# Patient Record
Sex: MALE | Race: WHITE | Employment: OTHER | URBAN - NONMETROPOLITAN AREA
[De-identification: names, ages, dates, MRNs, and addresses within clinical notes are randomized per-mention and may not be internally consistent; named-entity substitution may affect disease eponyms.]

---

## 2024-07-30 ENCOUNTER — HOSPITAL ENCOUNTER (OUTPATIENT)
Dept: WOUND CARE | Age: 69
Discharge: HOME | End: 2024-07-30
Payer: MEDICARE

## 2024-07-30 VITALS
DIASTOLIC BLOOD PRESSURE: 77 MMHG | RESPIRATION RATE: 16 BRPM | HEART RATE: 94 BPM | TEMPERATURE: 97.2 F | SYSTOLIC BLOOD PRESSURE: 153 MMHG

## 2024-07-30 DIAGNOSIS — L97.222 VENOUS STASIS ULCER OF LEFT CALF WITH FAT LAYER EXPOSED, UNSPECIFIED WHETHER VARICOSE VEINS PRESENT (HCC): ICD-10-CM

## 2024-07-30 DIAGNOSIS — I87.8 VENOUS STASIS OF BOTH LOWER EXTREMITIES: ICD-10-CM

## 2024-07-30 DIAGNOSIS — Q82.0 HEREDITARY LYMPHEDEMA OF LEGS: ICD-10-CM

## 2024-07-30 DIAGNOSIS — I83.022 VENOUS STASIS ULCER OF LEFT CALF WITH FAT LAYER EXPOSED, UNSPECIFIED WHETHER VARICOSE VEINS PRESENT (HCC): ICD-10-CM

## 2024-07-30 DIAGNOSIS — L97.522 DIABETIC ULCER OF TOE OF LEFT FOOT ASSOCIATED WITH TYPE 2 DIABETES MELLITUS, WITH FAT LAYER EXPOSED (HCC): Primary | ICD-10-CM

## 2024-07-30 DIAGNOSIS — E11.621 DIABETIC ULCER OF TOE OF LEFT FOOT ASSOCIATED WITH TYPE 2 DIABETES MELLITUS, WITH FAT LAYER EXPOSED (HCC): Primary | ICD-10-CM

## 2024-07-30 PROCEDURE — 29581 APPL MULTLAYER CMPRN SYS LEG: CPT

## 2024-07-30 PROCEDURE — 99203 OFFICE O/P NEW LOW 30 MIN: CPT | Performed by: NURSE PRACTITIONER

## 2024-07-30 PROCEDURE — 11042 DBRDMT SUBQ TIS 1ST 20SQCM/<: CPT | Performed by: NURSE PRACTITIONER

## 2024-07-30 PROCEDURE — 11042 DBRDMT SUBQ TIS 1ST 20SQCM/<: CPT

## 2024-07-30 PROCEDURE — 99203 OFFICE O/P NEW LOW 30 MIN: CPT

## 2024-07-30 RX ORDER — AMLODIPINE BESYLATE 10 MG/1
10 TABLET ORAL DAILY
COMMUNITY

## 2024-07-30 RX ORDER — LIDOCAINE 40 MG/G
CREAM TOPICAL ONCE
OUTPATIENT
Start: 2024-07-30 | End: 2024-07-30

## 2024-07-30 RX ORDER — ATORVASTATIN CALCIUM 10 MG/1
10 TABLET, FILM COATED ORAL DAILY
COMMUNITY

## 2024-07-30 RX ORDER — CLOBETASOL PROPIONATE 0.5 MG/G
OINTMENT TOPICAL ONCE
OUTPATIENT
Start: 2024-07-30 | End: 2024-07-30

## 2024-07-30 RX ORDER — IBUPROFEN 200 MG
TABLET ORAL ONCE
OUTPATIENT
Start: 2024-07-30 | End: 2024-07-30

## 2024-07-30 RX ORDER — LANOLIN ALCOHOL/MO/W.PET/CERES
1000 CREAM (GRAM) TOPICAL DAILY
COMMUNITY

## 2024-07-30 RX ORDER — BENZTROPINE MESYLATE 0.5 MG/1
0.5 TABLET ORAL 2 TIMES DAILY
COMMUNITY

## 2024-07-30 RX ORDER — GENTAMICIN SULFATE 1 MG/G
OINTMENT TOPICAL ONCE
OUTPATIENT
Start: 2024-07-30 | End: 2024-07-30

## 2024-07-30 RX ORDER — BETAMETHASONE DIPROPIONATE 0.5 MG/G
CREAM TOPICAL ONCE
OUTPATIENT
Start: 2024-07-30 | End: 2024-07-30

## 2024-07-30 RX ORDER — TRIAMCINOLONE ACETONIDE 1 MG/G
OINTMENT TOPICAL ONCE
OUTPATIENT
Start: 2024-07-30 | End: 2024-07-30

## 2024-07-30 RX ORDER — LIDOCAINE 50 MG/G
OINTMENT TOPICAL ONCE
OUTPATIENT
Start: 2024-07-30 | End: 2024-07-30

## 2024-07-30 RX ORDER — LIDOCAINE HYDROCHLORIDE 20 MG/ML
JELLY TOPICAL ONCE
OUTPATIENT
Start: 2024-07-30 | End: 2024-07-30

## 2024-07-30 RX ORDER — BACITRACIN ZINC AND POLYMYXIN B SULFATE 500; 1000 [USP'U]/G; [USP'U]/G
OINTMENT TOPICAL ONCE
OUTPATIENT
Start: 2024-07-30 | End: 2024-07-30

## 2024-07-30 RX ORDER — SODIUM CHLORIDE 1 G/1
1 TABLET ORAL DAILY
COMMUNITY

## 2024-07-30 RX ORDER — FUROSEMIDE 40 MG/1
40 TABLET ORAL DAILY
COMMUNITY

## 2024-07-30 RX ORDER — PIOGLITAZONEHYDROCHLORIDE 45 MG/1
45 TABLET ORAL DAILY
COMMUNITY

## 2024-07-30 RX ORDER — LIDOCAINE HYDROCHLORIDE 40 MG/ML
SOLUTION TOPICAL ONCE
OUTPATIENT
Start: 2024-07-30 | End: 2024-07-30

## 2024-07-30 RX ORDER — INSULIN GLARGINE 100 [IU]/ML
28 INJECTION, SOLUTION SUBCUTANEOUS NIGHTLY
COMMUNITY

## 2024-07-30 RX ORDER — LISINOPRIL 20 MG/1
20 TABLET ORAL DAILY
COMMUNITY

## 2024-07-30 RX ORDER — DOXYCYCLINE HYCLATE 100 MG
100 TABLET ORAL 2 TIMES DAILY
COMMUNITY

## 2024-07-30 RX ORDER — OLANZAPINE 5 MG/1
5 TABLET ORAL
COMMUNITY

## 2024-07-30 RX ORDER — SODIUM CHLOR/HYPOCHLOROUS ACID 0.033 %
SOLUTION, IRRIGATION IRRIGATION ONCE
OUTPATIENT
Start: 2024-07-30 | End: 2024-07-30

## 2024-07-30 RX ORDER — OLANZAPINE 10 MG/1
10 TABLET ORAL NIGHTLY
COMMUNITY

## 2024-07-30 RX ORDER — GINSENG 100 MG
CAPSULE ORAL ONCE
OUTPATIENT
Start: 2024-07-30 | End: 2024-07-30

## 2024-07-30 RX ORDER — DIVALPROEX SODIUM 500 MG/1
500 TABLET, DELAYED RELEASE ORAL 2 TIMES DAILY
COMMUNITY

## 2024-07-30 RX ORDER — INSULIN ASPART 100 [IU]/ML
9 INJECTION, SOLUTION INTRAVENOUS; SUBCUTANEOUS
COMMUNITY

## 2024-07-30 ASSESSMENT — PAIN DESCRIPTION - ORIENTATION: ORIENTATION: LEFT

## 2024-07-30 ASSESSMENT — PAIN DESCRIPTION - DESCRIPTORS: DESCRIPTORS: SORE

## 2024-07-30 ASSESSMENT — PAIN DESCRIPTION - ONSET: ONSET: ON-GOING

## 2024-07-30 ASSESSMENT — PAIN DESCRIPTION - LOCATION: LOCATION: LEG

## 2024-07-30 ASSESSMENT — PAIN - FUNCTIONAL ASSESSMENT: PAIN_FUNCTIONAL_ASSESSMENT: ACTIVITIES ARE NOT PREVENTED

## 2024-07-30 ASSESSMENT — PAIN DESCRIPTION - FREQUENCY: FREQUENCY: INTERMITTENT

## 2024-07-30 ASSESSMENT — PAIN DESCRIPTION - PAIN TYPE: TYPE: ACUTE PAIN

## 2024-07-30 ASSESSMENT — PAIN SCALES - GENERAL: PAINLEVEL_OUTOF10: 6

## 2024-07-30 NOTE — PATIENT INSTRUCTIONS
PHYSICIAN ORDERS AND DISCHARGE INSTRUCTIONS  NOTE: Upon discharge from the Wound Center, you will receive a patient experience survey via E-mail. We would be grateful if you would take the time to fill this survey out.  Wound care order history:   BILL's   Right       Left    Date    Vascular studies/Intervention: .     Cultures: .               Antibiotics: .               HbA1c:  .               Grafts:  .   Juxta Lites & Lymph Pumps:  Continuing wound care orders and information:              Residence: .              Continue home health care with:.    Your wound-care supplies will be provided by: .              Pharmacy: .  Wound cleansing:      Do not scrub or use excessive force.    Wash hands with soap and water before and after dressing changes.    Prior to applying a clean dressing, cleanse wound with normal saline,    wound cleanser, or mild soap and water.     Ask your physician or nurse before getting the wound(s) wet in the shower.  Daily Wound management:    Keep weight off wounds and reposition every 2 hours.    Avoid standing for long periods of time.    Evaluate legs to the level of the heart or above for 30 minutes 4-5 times a day and/or when sitting.       When taking antibiotics take entire prescription as ordered by MD do not stop taking until medicine is all gone.     Documentation:  Compression: .   Offloading: .        Orders for this week (7/30/2024):  Left lower leg - Wash with mild soap and water, rinse with saline, pat dry with 4x4  Paint left fore foot and toes with betadine  Calcium alginate between toes  Apply Puracol Ag to wounds  Cover with calcium alginate over toe wound and sorbex over leg wound  Wrap with coflex lite enclose toes.  (Right leg only needs wrapped)  Change 1 week    Wound measurements for 7/30/24: Left second toe L 0.7, A 0.4, D 0.2.  Left anterior lower leg L 0.6, W 1.4, D 0.2     Please dispense 30 day quantity when sending supplies     Follow up with MARY Polanco in 1

## 2024-07-30 NOTE — PROGRESS NOTES
Multilayer Compression Wrap   (Not Unna) Below the Knee    NAME:  Nhan Ga  YOB: 1955  MEDICAL RECORD NUMBER:  [id number]  DATE:  7/30/2024    Multilayer compression wrap: Removed old Multilayer wrap if indicated and wash leg with mild soap/water.  Applied moisturizing agent to dry skin as needed.   Applied primary and secondary dressing as ordered.  Applied multilayered dressing below the knee to right lower leg.  Applied multilayered dressing below the knee to left lower leg.  Instructed patient/caregiver not to remove dressing and to keep it clean and dry.   Instructed patient/caregiver on complications to report to provider, such as pain, numbness in toes, heavy drainage, and slippage of dressing.  Instructed patient on purpose of compression dressing and on activity and exercise recommendations.      Electronically signed by Echo Silva LPN on 7/30/2024 at 4:02 PM  
of COVID-19     Personal history of other diseases of the nervous system and sense organs        PAST SURGICAL HISTORY    History reviewed. No pertinent surgical history.    FAMILY HISTORY    History reviewed. No pertinent family history.    SOCIAL HISTORY    Social History     Tobacco Use    Smoking status: Every Day     Current packs/day: 0.25     Types: Cigarettes    Smokeless tobacco: Never    Tobacco comments:     Pt states he is allowed 6 cigarettes per day   Vaping Use    Vaping Use: Never used   Substance Use Topics    Alcohol use: Not Currently    Drug use: Never       ALLERGIES    No Known Allergies    MEDICATIONS    Current Outpatient Medications on File Prior to Encounter   Medication Sig Dispense Refill    Insulin Aspart FlexPen 100 UNIT/ML SOPN Inject 9 Units into the skin every morning (before breakfast)      insulin glargine (BASAGLAR KWIKPEN) 100 UNIT/ML injection pen Inject 28 Units into the skin nightly      amLODIPine (NORVASC) 10 MG tablet Take 1 tablet by mouth daily      atorvastatin (LIPITOR) 10 MG tablet Take 1 tablet by mouth daily      vitamin B-12 (CYANOCOBALAMIN) 1000 MCG tablet Take 1 tablet by mouth daily      benztropine (COGENTIN) 0.5 MG tablet Take 1 tablet by mouth 2 times daily      divalproex (DEPAKOTE) 500 MG DR tablet Take 1 tablet by mouth in the morning and 1 tablet in the evening.      furosemide (LASIX) 40 MG tablet Take 1 tablet by mouth daily      SITagliptin (JANUVIA) 100 MG tablet Take 1 tablet by mouth daily      lisinopril (PRINIVIL;ZESTRIL) 20 MG tablet Take 1 tablet by mouth daily      metFORMIN (GLUCOPHAGE) 1000 MG tablet Take 1 tablet by mouth 2 times daily at 0800 and 1400      OLANZapine (ZYPREXA) 10 MG tablet Take 1 tablet by mouth nightly      OLANZapine (ZYPREXA) 5 MG tablet Take 1 tablet by mouth every morning (before breakfast)      pioglitazone (ACTOS) 45 MG tablet Take 1 tablet by mouth daily      sodium chloride 1 g tablet Take 1 tablet by mouth daily

## 2024-08-06 ENCOUNTER — HOSPITAL ENCOUNTER (OUTPATIENT)
Dept: WOUND CARE | Age: 69
Discharge: HOME | End: 2024-08-06
Attending: NURSE PRACTITIONER
Payer: MEDICARE

## 2024-08-06 DIAGNOSIS — I83.022 VENOUS STASIS ULCER OF LEFT CALF WITH FAT LAYER EXPOSED, UNSPECIFIED WHETHER VARICOSE VEINS PRESENT (HCC): ICD-10-CM

## 2024-08-06 DIAGNOSIS — E11.621 DIABETIC ULCER OF TOE OF LEFT FOOT ASSOCIATED WITH TYPE 2 DIABETES MELLITUS, WITH FAT LAYER EXPOSED (HCC): Primary | ICD-10-CM

## 2024-08-06 DIAGNOSIS — L97.522 DIABETIC ULCER OF TOE OF LEFT FOOT ASSOCIATED WITH TYPE 2 DIABETES MELLITUS, WITH FAT LAYER EXPOSED (HCC): Primary | ICD-10-CM

## 2024-08-06 DIAGNOSIS — Q82.0 HEREDITARY LYMPHEDEMA OF LEGS: ICD-10-CM

## 2024-08-06 DIAGNOSIS — L97.222 VENOUS STASIS ULCER OF LEFT CALF WITH FAT LAYER EXPOSED, UNSPECIFIED WHETHER VARICOSE VEINS PRESENT (HCC): ICD-10-CM

## 2024-08-06 DIAGNOSIS — I87.8 VENOUS STASIS OF BOTH LOWER EXTREMITIES: ICD-10-CM

## 2024-08-06 PROCEDURE — 11042 DBRDMT SUBQ TIS 1ST 20SQCM/<: CPT

## 2024-08-06 PROCEDURE — 11042 DBRDMT SUBQ TIS 1ST 20SQCM/<: CPT | Performed by: NURSE PRACTITIONER

## 2024-08-06 RX ORDER — LIDOCAINE HYDROCHLORIDE 20 MG/ML
JELLY TOPICAL ONCE
OUTPATIENT
Start: 2024-08-06 | End: 2024-08-06

## 2024-08-06 RX ORDER — LIDOCAINE 40 MG/G
CREAM TOPICAL ONCE
OUTPATIENT
Start: 2024-08-06 | End: 2024-08-06

## 2024-08-06 RX ORDER — BACITRACIN ZINC AND POLYMYXIN B SULFATE 500; 1000 [USP'U]/G; [USP'U]/G
OINTMENT TOPICAL ONCE
OUTPATIENT
Start: 2024-08-06 | End: 2024-08-06

## 2024-08-06 RX ORDER — LIDOCAINE 50 MG/G
OINTMENT TOPICAL ONCE
OUTPATIENT
Start: 2024-08-06 | End: 2024-08-06

## 2024-08-06 RX ORDER — GINSENG 100 MG
CAPSULE ORAL ONCE
OUTPATIENT
Start: 2024-08-06 | End: 2024-08-06

## 2024-08-06 RX ORDER — GENTAMICIN SULFATE 1 MG/G
OINTMENT TOPICAL ONCE
OUTPATIENT
Start: 2024-08-06 | End: 2024-08-06

## 2024-08-06 RX ORDER — SODIUM CHLOR/HYPOCHLOROUS ACID 0.033 %
SOLUTION, IRRIGATION IRRIGATION ONCE
OUTPATIENT
Start: 2024-08-06 | End: 2024-08-06

## 2024-08-06 RX ORDER — IBUPROFEN 200 MG
TABLET ORAL ONCE
OUTPATIENT
Start: 2024-08-06 | End: 2024-08-06

## 2024-08-06 RX ORDER — CLOBETASOL PROPIONATE 0.5 MG/G
OINTMENT TOPICAL ONCE
OUTPATIENT
Start: 2024-08-06 | End: 2024-08-06

## 2024-08-06 RX ORDER — BETAMETHASONE DIPROPIONATE 0.5 MG/G
CREAM TOPICAL ONCE
OUTPATIENT
Start: 2024-08-06 | End: 2024-08-06

## 2024-08-06 RX ORDER — TRIAMCINOLONE ACETONIDE 1 MG/G
OINTMENT TOPICAL ONCE
OUTPATIENT
Start: 2024-08-06 | End: 2024-08-06

## 2024-08-06 RX ORDER — LIDOCAINE HYDROCHLORIDE 40 MG/ML
SOLUTION TOPICAL ONCE
OUTPATIENT
Start: 2024-08-06 | End: 2024-08-06

## 2024-08-06 NOTE — PROGRESS NOTES
via E-mail. We would be grateful if you would take the time to fill this survey out.  Wound care order history:   BILL's   Right       Left    Date    Vascular studies/Intervention: .     Cultures: .               Antibiotics: .               HbA1c:  .               Grafts:  .   Juxta Lites & Lymph Pumps:  Continuing wound care orders and information:              Residence: .              Continue home health care with:.    Your wound-care supplies will be provided by: .              Pharmacy: .  Wound cleansing:      Do not scrub or use excessive force.    Wash hands with soap and water before and after dressing changes.    Prior to applying a clean dressing, cleanse wound with normal saline,    wound cleanser, or mild soap and water.     Ask your physician or nurse before getting the wound(s) wet in the shower.  Daily Wound management:    Keep weight off wounds and reposition every 2 hours.    Avoid standing for long periods of time.    Evaluate legs to the level of the heart or above for 30 minutes 4-5 times a day and/or when sitting.       When taking antibiotics take entire prescription as ordered by MD do not stop taking until medicine is all gone.     Documentation:  Compression: .   Offloading: .        Orders for this week (8/6/2024):  Left lower leg - Wash with mild soap and water, rinse with saline, pat dry with 4x4  Paint left fore foot and toes with betadine  Calcium alginate between toes  Apply Puracol Ag to wounds  Cover with calcium alginate over toe wound and sorbex over leg wound  Wrap both legs with coflex lite.  Enclose toes on left leg only.    Change 1 week, keep wrap dry    Juxtalites ordered 8/6/24  Wound measurements for 8/6/24: Left second toe L 0.7, A 0.4, D 0.1   Left anterior lower leg L 0.6, W 1.5, D 0.2     Please dispense 30 day quantity when sending supplies     Follow up with MARY Polanco in 1 weeks in the wound care center  Call [phone] for any questions or concerns.    Treatment

## 2024-08-06 NOTE — PATIENT INSTRUCTIONS
PHYSICIAN ORDERS AND DISCHARGE INSTRUCTIONS  NOTE: Upon discharge from the Wound Center, you will receive a patient experience survey via E-mail. We would be grateful if you would take the time to fill this survey out.  Wound care order history:   BILL's   Right       Left    Date    Vascular studies/Intervention: .     Cultures: .               Antibiotics: .               HbA1c:  .               Grafts:  .   Juxta Lites & Lymph Pumps:  Continuing wound care orders and information:              Residence: .              Continue home health care with:.    Your wound-care supplies will be provided by: .              Pharmacy: .  Wound cleansing:      Do not scrub or use excessive force.    Wash hands with soap and water before and after dressing changes.    Prior to applying a clean dressing, cleanse wound with normal saline,    wound cleanser, or mild soap and water.     Ask your physician or nurse before getting the wound(s) wet in the shower.  Daily Wound management:    Keep weight off wounds and reposition every 2 hours.    Avoid standing for long periods of time.    Evaluate legs to the level of the heart or above for 30 minutes 4-5 times a day and/or when sitting.       When taking antibiotics take entire prescription as ordered by MD do not stop taking until medicine is all gone.     Documentation:  Compression: .   Offloading: .        Orders for this week (8/6/2024):  Left lower leg - Wash with mild soap and water, rinse with saline, pat dry with 4x4  Paint left fore foot and toes with betadine  Calcium alginate between toes  Apply Puracol Ag to wounds  Cover with calcium alginate over toe wound and sorbex over leg wound  Wrap both legs with coflex lite.  Enclose toes on left leg only.    Change 1 week, keep wrap dry    Juxtalites ordered 8/6/24  Wound measurements for 8/6/24: Left second toe L 0.7, A 0.4, D 0.1   Left anterior lower leg L 0.6, W 1.5, D 0.2     Please dispense 30 day quantity when sending

## 2024-08-20 ENCOUNTER — HOSPITAL ENCOUNTER (OUTPATIENT)
Dept: WOUND CARE | Age: 69
Discharge: HOME | End: 2024-08-20
Attending: NURSE PRACTITIONER
Payer: MEDICARE

## 2024-08-20 VITALS
DIASTOLIC BLOOD PRESSURE: 67 MMHG | RESPIRATION RATE: 16 BRPM | SYSTOLIC BLOOD PRESSURE: 173 MMHG | TEMPERATURE: 97.1 F | HEART RATE: 88 BPM

## 2024-08-20 DIAGNOSIS — L97.522 DIABETIC ULCER OF TOE OF LEFT FOOT ASSOCIATED WITH TYPE 2 DIABETES MELLITUS, WITH FAT LAYER EXPOSED (HCC): Primary | ICD-10-CM

## 2024-08-20 DIAGNOSIS — I83.022 VENOUS STASIS ULCER OF LEFT CALF WITH FAT LAYER EXPOSED, UNSPECIFIED WHETHER VARICOSE VEINS PRESENT (HCC): ICD-10-CM

## 2024-08-20 DIAGNOSIS — I87.8 VENOUS STASIS OF BOTH LOWER EXTREMITIES: ICD-10-CM

## 2024-08-20 DIAGNOSIS — Q82.0 HEREDITARY LYMPHEDEMA OF LEGS: ICD-10-CM

## 2024-08-20 DIAGNOSIS — E11.621 DIABETIC ULCER OF TOE OF LEFT FOOT ASSOCIATED WITH TYPE 2 DIABETES MELLITUS, WITH FAT LAYER EXPOSED (HCC): Primary | ICD-10-CM

## 2024-08-20 DIAGNOSIS — L97.222 VENOUS STASIS ULCER OF LEFT CALF WITH FAT LAYER EXPOSED, UNSPECIFIED WHETHER VARICOSE VEINS PRESENT (HCC): ICD-10-CM

## 2024-08-20 PROCEDURE — 99213 OFFICE O/P EST LOW 20 MIN: CPT | Performed by: NURSE PRACTITIONER

## 2024-08-20 PROCEDURE — 29581 APPL MULTLAYER CMPRN SYS LEG: CPT

## 2024-08-20 ASSESSMENT — PAIN - FUNCTIONAL ASSESSMENT: PAIN_FUNCTIONAL_ASSESSMENT: ACTIVITIES ARE NOT PREVENTED

## 2024-08-20 NOTE — PROGRESS NOTES
Multilayer Compression Wrap   (Not Unna) Below the Knee    NAME:  Nhan Ga  YOB: 1955  MEDICAL RECORD NUMBER:  [id number]  DATE:  8/20/2024    Multilayer compression wrap: Removed old Multilayer wrap if indicated and wash leg with mild soap/water.  Applied moisturizing agent to dry skin as needed.   Applied primary and secondary dressing as ordered.  Applied multilayered dressing below the knee to right lower leg.  Applied multilayered dressing below the knee to left lower leg.  Instructed patient/caregiver not to remove dressing and to keep it clean and dry.   Instructed patient/caregiver on complications to report to provider, such as pain, numbness in toes, heavy drainage, and slippage of dressing.  Instructed patient on purpose of compression dressing and on activity and exercise recommendations.      Electronically signed by Echo Silva LPN on 8/20/2024 at 2:59 PM  
Starts ___ O'Clock 0 08/20/24 1432   Undermining Ends___ O'Clock 0 08/20/24 1432   Undermining Maxium Distance (cm) 0 08/20/24 1432   Wound Assessment Dry 08/20/24 1432   Drainage Amount Scant (moist but unmeasurable) 08/20/24 1432   Drainage Description Yellow 08/20/24 1432   Odor None 08/20/24 1432   Brynn-wound Assessment Dry/flaky 08/20/24 1432   Margins Attached edges 08/20/24 1432   Wound Thickness Description not for Pressure Injury Full thickness 08/06/24 1457   Number of days: 20       Assessment:       Problem List Items Addressed This Visit          Circulatory    WD-Venous stasis of both lower extremities       Endocrine    WD-Diabetic ulcer of toe of left foot associated with type 2 diabetes mellitus, with fat layer exposed (HCC) - Primary       Other    WD-Venous stasis ulcer of left calf with fat layer exposed (HCC)    WD-Hereditary lymphedema of legs       Status of wound progress and description from last visit:   healed today.        Plan:     Patient Instructions   PHYSICIAN ORDERS AND DISCHARGE INSTRUCTIONS  NOTE: Upon discharge from the Wound Center, you will receive a patient experience survey via E-mail. We would be grateful if you would take the time to fill this survey out.  Wound care order history:   BILL's   Right       Left    Date    Vascular studies/Intervention: .     Cultures: .               Antibiotics: .               HbA1c:  .               Grafts:  .   Juxta Lites & Lymph Pumps:  Continuing wound care orders and information:              Residence: .              Continue home health care with:.    Your wound-care supplies will be provided by: .              Pharmacy: .  Wound cleansing:      Do not scrub or use excessive force.    Wash hands with soap and water before and after dressing changes.    Prior to applying a clean dressing, cleanse wound with normal saline,    wound cleanser, or mild soap and water.     Ask your physician or nurse before getting the wound(s) wet in the

## 2024-08-20 NOTE — PATIENT INSTRUCTIONS
PHYSICIAN ORDERS AND DISCHARGE INSTRUCTIONS  NOTE: Upon discharge from the Wound Center, you will receive a patient experience survey via E-mail. We would be grateful if you would take the time to fill this survey out.  Wound care order history:   BILL's   Right       Left    Date    Vascular studies/Intervention: .     Cultures: .               Antibiotics: .               HbA1c:  .               Grafts:  .   Juxta Lites & Lymph Pumps:  Continuing wound care orders and information:              Residence: .              Continue home health care with:.    Your wound-care supplies will be provided by: .              Pharmacy: .  Wound cleansing:      Do not scrub or use excessive force.    Wash hands with soap and water before and after dressing changes.    Prior to applying a clean dressing, cleanse wound with normal saline,    wound cleanser, or mild soap and water.     Ask your physician or nurse before getting the wound(s) wet in the shower.  Daily Wound management:    Keep weight off wounds and reposition every 2 hours.    Avoid standing for long periods of time.    Evaluate legs to the level of the heart or above for 30 minutes 4-5 times a day and/or when sitting.       When taking antibiotics take entire prescription as ordered by MD do not stop taking until medicine is all gone.     Documentation:  Compression: .   Offloading: .        Orders for this week (8/20/2024):  Left lower leg - Wash with mild soap and water, rinse with saline, pat dry with 4x4  Wrap both legs with coflex full.    Change 1 week, keep wrap dry  No activity restrictions    Juxtalites ordered 8/6/24  Wound measurements for 8/20/24: Left second toe L 0.0, A 0.0, D 0.0   Left anterior lower leg L 0.0, W 0., D 0.0     Please dispense 30 day quantity when sending supplies     Follow up with MARY Polanco in 1 weeks in the wound care center  Call [phone] for any questions or concerns.

## 2024-08-27 ENCOUNTER — HOSPITAL ENCOUNTER (OUTPATIENT)
Dept: WOUND CARE | Age: 69
Discharge: HOME | End: 2024-08-27
Attending: NURSE PRACTITIONER
Payer: MEDICARE

## 2024-08-27 VITALS
TEMPERATURE: 98.1 F | RESPIRATION RATE: 16 BRPM | HEART RATE: 99 BPM | SYSTOLIC BLOOD PRESSURE: 169 MMHG | DIASTOLIC BLOOD PRESSURE: 73 MMHG

## 2024-08-27 DIAGNOSIS — E11.621 DIABETIC ULCER OF TOE OF LEFT FOOT ASSOCIATED WITH TYPE 2 DIABETES MELLITUS, WITH FAT LAYER EXPOSED (HCC): Primary | ICD-10-CM

## 2024-08-27 DIAGNOSIS — I87.8 VENOUS STASIS OF BOTH LOWER EXTREMITIES: ICD-10-CM

## 2024-08-27 DIAGNOSIS — L97.522 DIABETIC ULCER OF TOE OF LEFT FOOT ASSOCIATED WITH TYPE 2 DIABETES MELLITUS, WITH FAT LAYER EXPOSED (HCC): Primary | ICD-10-CM

## 2024-08-27 DIAGNOSIS — Q82.0 HEREDITARY LYMPHEDEMA OF LEGS: ICD-10-CM

## 2024-08-27 DIAGNOSIS — L97.222 VENOUS STASIS ULCER OF LEFT CALF WITH FAT LAYER EXPOSED, UNSPECIFIED WHETHER VARICOSE VEINS PRESENT (HCC): ICD-10-CM

## 2024-08-27 DIAGNOSIS — I83.022 VENOUS STASIS ULCER OF LEFT CALF WITH FAT LAYER EXPOSED, UNSPECIFIED WHETHER VARICOSE VEINS PRESENT (HCC): ICD-10-CM

## 2024-08-27 PROCEDURE — 99213 OFFICE O/P EST LOW 20 MIN: CPT

## 2024-08-27 PROCEDURE — 99213 OFFICE O/P EST LOW 20 MIN: CPT | Performed by: NURSE PRACTITIONER

## 2024-08-27 RX ORDER — GINSENG 100 MG
CAPSULE ORAL ONCE
OUTPATIENT
Start: 2024-08-27 | End: 2024-08-27

## 2024-08-27 RX ORDER — LIDOCAINE 40 MG/G
CREAM TOPICAL ONCE
OUTPATIENT
Start: 2024-08-27 | End: 2024-08-27

## 2024-08-27 RX ORDER — TRIAMCINOLONE ACETONIDE 1 MG/G
OINTMENT TOPICAL ONCE
OUTPATIENT
Start: 2024-08-27 | End: 2024-08-27

## 2024-08-27 RX ORDER — NEOMYCIN/BACITRACIN/POLYMYXINB 3.5-400-5K
OINTMENT (GRAM) TOPICAL ONCE
OUTPATIENT
Start: 2024-08-27 | End: 2024-08-27

## 2024-08-27 RX ORDER — CLOBETASOL PROPIONATE 0.5 MG/G
OINTMENT TOPICAL ONCE
OUTPATIENT
Start: 2024-08-27 | End: 2024-08-27

## 2024-08-27 RX ORDER — LIDOCAINE HYDROCHLORIDE 20 MG/ML
JELLY TOPICAL ONCE
OUTPATIENT
Start: 2024-08-27 | End: 2024-08-27

## 2024-08-27 RX ORDER — LIDOCAINE HYDROCHLORIDE 40 MG/ML
SOLUTION TOPICAL ONCE
OUTPATIENT
Start: 2024-08-27 | End: 2024-08-27

## 2024-08-27 RX ORDER — SODIUM CHLOR/HYPOCHLOROUS ACID 0.033 %
SOLUTION, IRRIGATION IRRIGATION ONCE
OUTPATIENT
Start: 2024-08-27 | End: 2024-08-27

## 2024-08-27 RX ORDER — MUPIROCIN 20 MG/G
OINTMENT TOPICAL ONCE
OUTPATIENT
Start: 2024-08-27 | End: 2024-08-27

## 2024-08-27 RX ORDER — SILVER SULFADIAZINE 10 MG/G
CREAM TOPICAL ONCE
OUTPATIENT
Start: 2024-08-27 | End: 2024-08-27

## 2024-08-27 RX ORDER — BACITRACIN ZINC AND POLYMYXIN B SULFATE 500; 1000 [USP'U]/G; [USP'U]/G
OINTMENT TOPICAL ONCE
OUTPATIENT
Start: 2024-08-27 | End: 2024-08-27

## 2024-08-27 RX ORDER — GENTAMICIN SULFATE 1 MG/G
OINTMENT TOPICAL ONCE
OUTPATIENT
Start: 2024-08-27 | End: 2024-08-27

## 2024-08-27 RX ORDER — BETAMETHASONE DIPROPIONATE 0.5 MG/G
CREAM TOPICAL ONCE
OUTPATIENT
Start: 2024-08-27 | End: 2024-08-27

## 2024-08-27 RX ORDER — LIDOCAINE 50 MG/G
OINTMENT TOPICAL ONCE
OUTPATIENT
Start: 2024-08-27 | End: 2024-08-27

## 2024-08-27 NOTE — PATIENT INSTRUCTIONS
PHYSICIAN ORDERS AND DISCHARGE INSTRUCTIONS  NOTE: Upon discharge from the Wound Center, you will receive a patient experience survey via E-mail. We would be grateful if you would take the time to fill this survey out.  Wound care order history:   BILL's   Right       Left    Date    Vascular studies/Intervention: .     Cultures: .               Antibiotics: .               HbA1c:  .               Grafts:  .   Juxta Lites & Lymph Pumps:  Continuing wound care orders and information:              Residence: .              Continue home health care with:.    Your wound-care supplies will be provided by: .              Pharmacy: .  Wound cleansing:      Do not scrub or use excessive force.    Wash hands with soap and water before and after dressing changes.    Prior to applying a clean dressing, cleanse wound with normal saline,    wound cleanser, or mild soap and water.     Ask your physician or nurse before getting the wound(s) wet in the shower.  Daily Wound management:    Keep weight off wounds and reposition every 2 hours.    Avoid standing for long periods of time.    Evaluate legs to the level of the heart or above for 30 minutes 4-5 times a day and/or when sitting.       When taking antibiotics take entire prescription as ordered by MD do not stop taking until medicine is all gone.     Documentation:  Compression: .   Offloading: .        Orders for this week (8/27/2024):  Left lower leg - Wash with mild soap and water, rinse with saline, pat dry with 4x4  Moisturize legs daily  Apply Juxtalite to left leg in the morning before legs are down for the day.  Juxtalite can be taken off at night while legs are elevated or to take a shower.  Can re-order Juxtalite in 6 months.  For re-order can call [facility] at [phone]    Juxtalites ordered 8/6/24  Wound measurements for 8/20/24: Left second toe L 0.0, A 0.0, D 0.0   Left anterior lower leg L 0.0, W 0., D 0.0     Please dispense 30 day quantity when

## 2024-08-27 NOTE — PROGRESS NOTES
Wound Care Center Progress Note       Nhan Ga  AGE: 69 y.o.   GENDER: male  : 1955  TODAY'S DATE:  2024        Subjective:     Chief Complaint   Patient presents with    Wound Check     BLE          HISTORY of PRESENT ILLNESS    Nhan Ga is a 69 y.o. male who presents to the [facility] for a visit for evaluation and treatment of Chronic venous and diabetic  ulcer(s) of  L lower leg anterior, Lt. foot 2nd toe.  The condition is of moderate severity. The ulcer has been present for several months up to a year.  The underlying cause is thought to be diabetes and venous stasis.  The patients care to date has included nothing so far other than management with diuretics. The patient has significant underlying medical conditions as below.      Healed today  Only able to get 1 juxta-lites   Will apply to leg with wounds and hope this works for now     Wound Pain Timing/Severity: waxing and waning  Quality of pain: dull, aching  Severity of pain:  2 / 10   Modifying Factors: edema, venous stasis, lymphedema, diabetes, poor glucose control, decreased mobility, obesity, and smoking  Associated Signs/Symptoms: edema, erythema, drainage, and pain        PAST MEDICAL HISTORY        Diagnosis Date    Cerebral palsy (HCC)     Chronic venous hypertension (idiopathic) with inflammation of unspecified lower extremity     Diabetes mellitus (HCC)     Diabetes mellitus (HCC)     Edema     Extrapyramidal and movement disorder     Hyperlipidemia     Hypertension     Hypo-osmolality and hyponatremia     Lack of coordination     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  Juxtalite can be taken off at night while legs are elevated or to take a shower.  Can re-order Juxtalite in 6 months.  For re-order can call [facility] at [phone]    Juxtalites ordered 8/6/24  Wound measurements for 8/20/24: Left second toe L 0.0, A 0.0, D 0.0   Left anterior lower leg L 0.0, W 0., D 0.0     Please dispense 30 day quantity when sending supplies     Follow up with MARY Polanco in 1 weeks in the wound care center  Call [phone] for any questions or concerns.    Treatment Note      Written Patient Dismissal Instructions Given            Electronically signed by MAURIZIO Mercer CNP on 8/27/2024 at 3:08 PM

## 2024-09-10 ENCOUNTER — HOSPITAL ENCOUNTER (OUTPATIENT)
Dept: WOUND CARE | Age: 69
Discharge: HOME | End: 2024-09-10
Attending: NURSE PRACTITIONER
Payer: MEDICARE

## 2024-09-10 VITALS
DIASTOLIC BLOOD PRESSURE: 75 MMHG | HEART RATE: 90 BPM | SYSTOLIC BLOOD PRESSURE: 167 MMHG | RESPIRATION RATE: 16 BRPM | TEMPERATURE: 97.5 F

## 2024-09-10 DIAGNOSIS — Q82.0 HEREDITARY LYMPHEDEMA OF LEGS: ICD-10-CM

## 2024-09-10 DIAGNOSIS — I83.022 VENOUS STASIS ULCER OF LEFT CALF WITH FAT LAYER EXPOSED, UNSPECIFIED WHETHER VARICOSE VEINS PRESENT (HCC): ICD-10-CM

## 2024-09-10 DIAGNOSIS — L97.222 VENOUS STASIS ULCER OF LEFT CALF WITH FAT LAYER EXPOSED, UNSPECIFIED WHETHER VARICOSE VEINS PRESENT (HCC): ICD-10-CM

## 2024-09-10 DIAGNOSIS — I87.8 VENOUS STASIS OF BOTH LOWER EXTREMITIES: ICD-10-CM

## 2024-09-10 DIAGNOSIS — E11.621 DIABETIC ULCER OF TOE OF LEFT FOOT ASSOCIATED WITH TYPE 2 DIABETES MELLITUS, WITH FAT LAYER EXPOSED (HCC): Primary | ICD-10-CM

## 2024-09-10 DIAGNOSIS — L97.522 DIABETIC ULCER OF TOE OF LEFT FOOT ASSOCIATED WITH TYPE 2 DIABETES MELLITUS, WITH FAT LAYER EXPOSED (HCC): Primary | ICD-10-CM

## 2024-09-10 PROCEDURE — 99213 OFFICE O/P EST LOW 20 MIN: CPT
